# Patient Record
Sex: MALE
[De-identification: names, ages, dates, MRNs, and addresses within clinical notes are randomized per-mention and may not be internally consistent; named-entity substitution may affect disease eponyms.]

---

## 2023-12-21 ENCOUNTER — APPOINTMENT (OUTPATIENT)
Dept: PLASTIC SURGERY | Facility: CLINIC | Age: 18
End: 2023-12-21
Payer: COMMERCIAL

## 2023-12-21 VITALS — WEIGHT: 140 LBS | BODY MASS INDEX: 20.73 KG/M2 | HEIGHT: 69 IN

## 2023-12-21 DIAGNOSIS — Z78.9 OTHER SPECIFIED HEALTH STATUS: ICD-10-CM

## 2023-12-21 DIAGNOSIS — Z72.3 LACK OF PHYSICAL EXERCISE: ICD-10-CM

## 2023-12-21 PROCEDURE — 99203 OFFICE O/P NEW LOW 30 MIN: CPT

## 2023-12-21 NOTE — ASSESSMENT
[FreeTextEntry1] : office procedure  Regarding the procedure, we discussed scarring, poor wound healing, bleeding, infection, need for additional surgery, and dissatisfaction with the outcome.  Also discussed possibility of keloid and/or hypertrophic scar formation as well as recurrence.  All questions were answered and risks understood.

## 2024-03-01 ENCOUNTER — APPOINTMENT (OUTPATIENT)
Dept: PLASTIC SURGERY | Facility: CLINIC | Age: 19
End: 2024-03-01
Payer: COMMERCIAL

## 2024-03-01 DIAGNOSIS — D48.5 NEOPLASM OF UNCERTAIN BEHAVIOR OF SKIN: ICD-10-CM

## 2024-03-01 DIAGNOSIS — L72.9 FOLLICULAR CYST OF THE SKIN AND SUBCUTANEOUS TISSUE, UNSPECIFIED: ICD-10-CM

## 2024-03-01 PROCEDURE — 13100 CMPLX RPR TRUNK 1.1-2.5 CM: CPT

## 2024-03-01 PROCEDURE — 11402 EXC TR-EXT B9+MARG 1.1-2 CM: CPT

## 2024-03-01 RX ORDER — DOXYCYCLINE HYCLATE 100 MG/1
100 TABLET ORAL
Qty: 10 | Refills: 2 | Status: ACTIVE | COMMUNITY
Start: 2024-03-01 | End: 1900-01-01

## 2024-03-01 NOTE — PROCEDURE
[FreeTextEntry6] : Patient is a 18 year old male with a left axillary cyst measuring approximately 1.5 x 2 cm.    The area was prepped and draped in the usual fashion.  Local anesthetic was administered using 1% lidocaine with epinephrine.  The cyst was sharply excised.  Area was irrigated copiously.  Complex wound closure was performed in layers.  The wound measured approximately 2.3 cm.  Sterile dressing applied.    Patient tolerated procedure well and understands post-op instructions.  Sutures Used: 3-0 monocryl

## 2024-03-08 LAB — CORE LAB BIOPSY: NORMAL

## 2024-03-15 ENCOUNTER — APPOINTMENT (OUTPATIENT)
Dept: PLASTIC SURGERY | Facility: CLINIC | Age: 19
End: 2024-03-15
Payer: COMMERCIAL

## 2024-03-15 PROCEDURE — 99213 OFFICE O/P EST LOW 20 MIN: CPT

## 2024-03-15 NOTE — ASSESSMENT
[FreeTextEntry1] :  pt is 2 weeks s/p excision of left axillary cyst. With full thickness wound dehicience   - D/c remaining monocryl sutures from wound bed - Wound irrigated with 10ccs NS - LWC reviewed: Pack with 2x2 WTD gauze BID - signs and symptoms of infection reviewed - Avoid any heavy lifting  - ok to shower - Path reviewed: No cyst, Fibroadipose tissue with focal hemorrhage, likely c/w lipoma - All post op instructions reviewed, all questions answered - f/u 1-2 weeks for wound check

## 2024-03-15 NOTE — HISTORY OF PRESENT ILLNESS
[FreeTextEntry1] : 18 yr old male nonsmoker nondiabetic  cyst left axilla no prior surgery  Interval hx (3/15/24): pt is 2 weeks s/p excision of left axillary cyst. Pt states that the incision was doing fine untl 4 days ago when he notcied it opened, denies any heavy lifitng/strenous activity or trauma. Has been putting betadine on the area daily since. Denies f/c. Some drainage. Also concerned of new cyst formation adjacent to current  incision.

## 2024-03-15 NOTE — DATA REVIEWED
[FreeTextEntry1] : Patient:     RICHIE LEMOS  Accession:                             [id number]  Collected Date/Time:                   3/1/2024 13:42 EST Received Date/Time:                    3/4/2024 09:02 EST  Surgical Pathology Report - Auth (Verified)  Specimen(s) Submitted Left axillary cyst  Final Diagnosis Left axillary cyst, excision: - Fibroadipose tissue with focal hemorrhage. - No cyst identified.  Deeper levels examined.  Note:  Above findings may represent a lipoma.  Clinical correlation  recommended. Verified by: Maricarmen Flynn M.D. (Electronic Signature) Reported on: 03/08/24 11:42 EST, [facility], [place] [address] Histology technical [place] [address] Phone: [phone]   Fax: [phone] _________________________________________________________________   Clinical Information Left axillary cyst  Perioperative Diagnosis D48.5-Neoplasm of uncertain behavior of skin  Gross Description The specimen is received in formalin labeled "left axillary cyst" and consists of a fragment of grayish-brown membranous soft tissue with fibroadipose tissue measuring 2.5 x 1 x 1 cm.  The surface is inked black.  The specimen is serially sectioned and submitted entirely.  (1 block)  03/04/2024 10:44:56 EST  lm   Disclaimer In addition to other data that may appear on the specimen containers, all labels have been inspected to confirm the presence of the patients name and date of birth.  Specimen was received and underwent gross examination at [facility], [address] [place] [address].  Ordered by: SUE ISRAEL IV       Collected/Examined: 01Mar2024 01:42PM       Verification Required       Stage: Final       Performed at: [facility] (Med Director: Shashank Chapin)       Resulted: 08Mar2024 11:42AM       Last Updated: 08Mar2024 11:42AM       Accession: [id number]       Results Hx:	 There are no additional results to show Details:	 To Be Done:	01Mar2024 Status:	Resulted: Requires Verification For:	Neoplasm of uncertain behavior of skin (D48.5) Overdue:	01Jun2024 01:41PM To Be Performed:	[facility] Communicated By:	Requested transmission to performing location Priority:	Routine Ordered By:	SUE ISRAEL IV Supervised By:	SUE ISRAEL IV Managed By:	SUE ISRAEL IV Authorization:	Not Required Performing Instructions:	 Patient Instructions:	 Order Instructions:	 Questions:	 Date/Time Collected A: 01Mar2024 Number of Sites (Enter each site description below.) A: 1 Site of Specimen A: Left axillary cyst Add'l Details:	 Financial Auth:	Not Needed Authorization #:	 Appt. Status:	Appointment Not Needed Effective:	01Mar2024 12:00AM Expires:	01Mar2025 12:00AM Done:	01Mar2024 01:42PM Order #:	[id number] Requisition #:	[id number] Label Type:	 Collection:	Collection Specimen Identifier:	 ID:	 CPT:	 LOINC:	 SNOMED:	 Type:	 Charges:	None Will Be Collected in Office?	No View link item history	 Goals:	None Charging:	          (none) Encounters:	 Creation:	01Mar2024 Appointment SUE ISRAEL IV (Plastic Surgery)  Collection:	None Specified Be Done By:	None Specified Scheduled:	None Specified Performed:	None Specified Charge :	None Specified Annotations:	          (none) Electronically Signed By: SUE ISRAEL IV, MD 01-Mar-2024 1:42 PM

## 2024-03-15 NOTE — PHYSICAL EXAM
[NI] : Normal [de-identified] : Well developed, well nourished in NAD  [de-identified] : Atraumatic, normocephalic  [de-identified] : Normal ears, normal nose and normal lips  [de-identified] : VERONIKAR [de-identified] : Left axilla with 1.6cm x 2cm open wound with dark wound bed 2/2 betadine use. No surrounding cellulitis, erythema. No active drainage or malodor. New 1.4cm soft subcutaneous mass adjacent to opening

## 2024-03-26 ENCOUNTER — APPOINTMENT (OUTPATIENT)
Dept: PLASTIC SURGERY | Facility: CLINIC | Age: 19
End: 2024-03-26
Payer: COMMERCIAL

## 2024-03-26 DIAGNOSIS — D17.22 BENIGN LIPOMATOUS NEOPLASM OF SKIN AND SUBCUTANEOUS TISSUE OF LEFT ARM: ICD-10-CM

## 2024-03-26 PROCEDURE — 99213 OFFICE O/P EST LOW 20 MIN: CPT

## 2024-03-26 NOTE — DATA REVIEWED
[FreeTextEntry1] : Patient:     RICHIE LEMOS  Accession:                             [id number]  Collected Date/Time:                   3/1/2024 13:42 EST Received Date/Time:                    3/4/2024 09:02 EST  Surgical Pathology Report - Auth (Verified)  Specimen(s) Submitted Left axillary cyst  Final Diagnosis Left axillary cyst, excision: - Fibroadipose tissue with focal hemorrhage. - No cyst identified.  Deeper levels examined.  Note:  Above findings may represent a lipoma.  Clinical correlation  recommended. Verified by: Maricarmen Flynn M.D. (Electronic Signature) Reported on: 03/08/24 11:42 EST, [facility], [place] [address] Histology technical [place] [address] Phone: [phone]   Fax: [phone] _________________________________________________________________   Clinical Information Left axillary cyst  Perioperative Diagnosis D48.5-Neoplasm of uncertain behavior of skin  Gross Description The specimen is received in formalin labeled "left axillary cyst" and consists of a fragment of grayish-brown membranous soft tissue with fibroadipose tissue measuring 2.5 x 1 x 1 cm.  The surface is inked black.  The specimen is serially sectioned and submitted entirely.  (1 block)  03/04/2024 10:44:56 EST  lm   Disclaimer In addition to other data that may appear on the specimen containers, all labels have been inspected to confirm the presence of the patients name and date of birth.  Specimen was received and underwent gross examination at [facility], [address] [place] [address].  Ordered by: SUE ISRAEL IV       Collected/Examined: 01Mar2024 01:42PM       Verification Required       Stage: Final       Performed at: [facility] (Med Director: Shashank Chapin)       Resulted: 08Mar2024 11:42AM       Last Updated: 08Mar2024 11:42AM       Accession: [id number]       Results Hx:	 There are no additional results to show Details:	 To Be Done:	01Mar2024 Status:	Resulted: Requires Verification For:	Neoplasm of uncertain behavior of skin (D48.5) Overdue:	01Jun2024 01:41PM To Be Performed:	[facility] Communicated By:	Requested transmission to performing location Priority:	Routine Ordered By:	SUE ISRAEL IV Supervised By:	SUE ISRAEL IV Managed By:	SUE ISRAEL IV Authorization:	Not Required Performing Instructions:	 Patient Instructions:	 Order Instructions:	 Questions:	 Date/Time Collected A: 01Mar2024 Number of Sites (Enter each site description below.) A: 1 Site of Specimen A: Left axillary cyst Add'l Details:	 Financial Auth:	Not Needed Authorization #:	 Appt. Status:	Appointment Not Needed Effective:	01Mar2024 12:00AM Expires:	01Mar2025 12:00AM Done:	01Mar2024 01:42PM Order #:	[id number] Requisition #:	[id number] Label Type:	 Collection:	Collection Specimen Identifier:	 ID:	 CPT:	 LOINC:	 SNOMED:	 Type:	 Charges:	None Will Be Collected in Office?	No View link item history	 Goals:	None Charging:	          (none) Encounters:	 Creation:	01Mar2024 Appointment SUE ISRAEL IV (Plastic Surgery)  Collection:	None Specified Be Done By:	None Specified Scheduled:	None Specified Performed:	None Specified Charge :	None Specified Annotations:	          (none) Electronically Signed By: SUE ISRAEL IV, MD 01-Mar-2024 1:42 PM

## 2024-03-26 NOTE — HISTORY OF PRESENT ILLNESS
[FreeTextEntry1] : 18 yr old male nonsmoker nondiabetic  cyst left axilla no prior surgery  Interval hx (3/15/24): pt is 2 weeks s/p excision of left axillary cyst. Pt states that the incision was doing fine untl 4 days ago when he notcied it opened, denies any heavy lifitng/strenous activity or trauma. Has been putting betadine on the area daily since. Denies f/c. Some drainage. Also concerned of new cyst formation adjacent to current  incision.   Interval hx (3/26/24): pt is 3 weeks s/p excision of left axillary fibroadipose tissue. Has been performing WTD dressings BID, along with intermittently using betadine swabs. Denies f/c or malodor.

## 2024-03-26 NOTE — PHYSICAL EXAM
[NI] : Normal [de-identified] : Well developed, well nourished in NAD  [de-identified] : Atraumatic, normocephalic  [de-identified] : Normal ears, normal nose and normal lips  [de-identified] : VERONIKAR [de-identified] : Left axilla with 1.4cm x 2cm open wound with bright red erythematous healthy wound bed, approx 1cm tunneling superior medial. Mild surrounding erythema 2/2 adhesive bandage.  No active drainage or malodor. New 1.4cm soft subcutaneous mass adjacent to opening

## 2024-03-26 NOTE — ASSESSMENT
Recorded Pressure: PA, HR=68, Condition=Condition 1 (Pulmonary Artery) PA 74/28/47 [FreeTextEntry1] :  pt is 3 weeks s/p excision of left axillary cyst. With full thickness wound dehicience   - LWC reviewed: continue to pack with 2x2 WTD gauze BID - Ok to use MediHoney for base of wound bed once daily, along with packing. - signs and symptoms of infection reviewed - Avoid any heavy lifting  - ok to shower and get area wet, dry thoroughly after - Path reviewed: No cyst, Fibroadipose tissue with focal hemorrhage, likely c/w lipoma - All post op instructions reviewed, all questions answered - f/u 3 weeks for wound check  Seen and examined by  as well

## 2024-04-16 ENCOUNTER — APPOINTMENT (OUTPATIENT)
Dept: PLASTIC SURGERY | Facility: CLINIC | Age: 19
End: 2024-04-16
Payer: COMMERCIAL

## 2024-04-16 PROCEDURE — 99213 OFFICE O/P EST LOW 20 MIN: CPT

## 2024-04-16 NOTE — HISTORY OF PRESENT ILLNESS
[FreeTextEntry1] : 18 yr old male presents with cyst left axilla no prior surgery  nonsmoker nondiabetic  Interval hx (3/15/24): pt is 2 weeks s/p excision of left axillary cyst. Pt states that the incision was doing fine untl 4 days ago when he notcied it opened, denies any heavy lifitng/strenous activity or trauma. Has been putting betadine on the area daily since. Denies f/c. Some drainage. Also concerned of new cyst formation adjacent to current  incision.   Interval hx (3/26/24): pt is 3 weeks s/p excision of left axillary fibroadipose tissue. Has been performing WTD dressings BID, along with intermittently using betadine swabs. Denies f/c or malodor.   Interval hx (4/16/24): Pt here 6 weeks s/p excision of left axillary fibroadipose tissue with delayed healing and wound dehiscence. Performing daily wound care with packing. Denies any f/c or purulence but concerned with lack of progress in healing.

## 2024-04-16 NOTE — DATA REVIEWED
[FreeTextEntry1] : Patient:     RICHIE LEMOS   Accession:                             [id number]  Collected Date/Time:                   3/1/2024 13:42 EST Received Date/Time:                    3/4/2024 09:02 EST  Surgical Pathology Report - Auth (Verified)  Specimen(s) Submitted Left axillary cyst  Final Diagnosis Left axillary cyst, excision: - Fibroadipose tissue with focal hemorrhage. - No cyst identified.  Deeper levels examined.  Note:  Above findings may represent a lipoma.  Clinical correlation  recommended. Verified by: Maricarmen Flynn M.D. (Electronic Signature) Reported on: 03/08/24 11:42 EST, [facility], [place] [address] Histology technical [place] [address] Phone: [phone]   Fax: [phone] _______________________________________________________________

## 2024-04-16 NOTE — ASSESSMENT
[FreeTextEntry1] : 19 yo M 6 weeks s/p excision of left axillary fibroadipose tissue with wound dehiscence and delayed healing.   - Minor wound debridement performed today with wound care  - LWC reviewed: continue WTD packing BID for 3 days then daily - Wash wound with soap and water  - Path reviewed: No cyst, Fibroadipose tissue with focal hemorrhage, likely c/w lipoma - All post op instructions reviewed, all questions answered - f/u 1 week for wound check

## 2024-04-25 ENCOUNTER — APPOINTMENT (OUTPATIENT)
Dept: PLASTIC SURGERY | Facility: CLINIC | Age: 19
End: 2024-04-25
Payer: COMMERCIAL

## 2024-04-25 PROCEDURE — 99213 OFFICE O/P EST LOW 20 MIN: CPT

## 2024-04-25 NOTE — DATA REVIEWED
[FreeTextEntry1] : Patient:     RICHIE LEMOS   Accession:                             [id number]  Collected Date/Time:                   3/1/2024 13:42 EST Received Date/Time:                    3/4/2024 09:02 EST  Surgical Pathology Report - Auth (Verified)  Specimen(s) Submitted Left axillary cyst  Final Diagnosis Left axillary cyst, excision: - Fibroadipose tissue with focal hemorrhage. - No cyst identified.  Deeper levels examined.  Note:  Above findings may represent a lipoma.  Clinical correlation  recommended. Verified by: Maricarmen Flynn M.D. (Electronic Signature) Reported on: 03/08/24 11:42 EST, [facility], [place] [address] Histology technical [place] [address] Phone: [phone]   Fax: [phone] _______________________________________________________________

## 2024-04-25 NOTE — PHYSICAL EXAM
[NI] : Normal [de-identified] : Well developed, well nourished in NAD  [de-identified] : Left axilla with 2cm open wound with granulating base, inferior wound epithelializing and filling in, superior aspect with 0.5 cm depth, clean, no periwound erythema, no purulence

## 2024-04-25 NOTE — HISTORY OF PRESENT ILLNESS
[FreeTextEntry1] : 19 yr old male presents with cyst left axilla no prior surgery  nonsmoker nondiabetic  Interval hx (3/15/24): pt is 2 weeks s/p excision of left axillary cyst. Pt states that the incision was doing fine until 4 days ago when he noticed it opened, denies any heavy lifting/strenuous activity or trauma. Has been putting betadine on the area daily since. Denies f/c. Some drainage. Also concerned of new cyst formation adjacent to current  incision.   Interval hx (3/26/24): pt is 3 weeks s/p excision of left axillary fibroadipose tissue. Has been performing WTD dressings BID, along with intermittently using betadine swabs. Denies f/c or malodor.   Interval hx (4/16/24): Pt here 6 weeks s/p excision of left axillary fibroadipose tissue with delayed healing and wound dehiscence. Performing daily wound care with packing. Denies any f/c or purulence but concerned with lack of progress in healing.   Interval hx (4/25/24): Pt here 7 weeks s/p excision of left axillary fibroadipose tissue with delayed healing and wound dehiscence concerned with scarring. Noticed improvement in healing after debridement last week. Denies any f/c or purulent drainage.

## 2024-04-25 NOTE — ASSESSMENT
[FreeTextEntry1] : 19 yo M 7 weeks s/p excision of left axillary fibroadipose tissue with wound dehiscence and delayed healing.   - Wound care performed today  - LWC reviewed: daily Bacitracin for 3 days then daily Medi Honey or Aquaphor=r - Wash wound with soap and water  - Path reviewed: No cyst, Fibroadipose tissue with focal hemorrhage, likely c/w lipoma - All post op instructions reviewed, all questions answered - f/u 2 weeks for wound check

## 2024-05-09 ENCOUNTER — APPOINTMENT (OUTPATIENT)
Dept: PLASTIC SURGERY | Facility: CLINIC | Age: 19
End: 2024-05-09
Payer: COMMERCIAL

## 2024-05-09 PROCEDURE — 99212 OFFICE O/P EST SF 10 MIN: CPT

## 2024-05-17 NOTE — PHYSICAL EXAM
[NI] : Normal [de-identified] : Well developed, well nourished in NAD  [de-identified] : Left axilla with an open wound with granulating base, inferior wound epithelializing and filling in, superior aspect with 0.5 cm depth, clean, no periwound erythema, no purulence

## 2024-05-17 NOTE — HISTORY OF PRESENT ILLNESS
[FreeTextEntry1] : 19 yr old male presents with cyst left axilla no prior surgery  nonsmoker nondiabetic  Interval hx (3/15/24): pt is 2 weeks s/p excision of left axillary cyst. Pt states that the incision was doing fine until 4 days ago when he noticed it opened, denies any heavy lifting/strenuous activity or trauma. Has been putting betadine on the area daily since. Denies f/c. Some drainage. Also concerned of new cyst formation adjacent to current  incision.   Interval hx (3/26/24): pt is 3 weeks s/p excision of left axillary fibroadipose tissue. Has been performing WTD dressings BID, along with intermittently using betadine swabs. Denies f/c or malodor.   Interval hx (4/16/24): Pt here 6 weeks s/p excision of left axillary fibroadipose tissue with delayed healing and wound dehiscence. Performing daily wound care with packing. Denies any f/c or purulence but concerned with lack of progress in healing.   Interval hx (4/25/24): Pt here 7 weeks s/p excision of left axillary fibroadipose tissue with delayed healing and wound dehiscence concerned with scarring. Noticed improvement in healing after debridement last week. Denies any f/c or purulent drainage.   Interval hx (5/9/24): Pt here 9 weeks s/p excision of left axillary fibroadipose tissue with delayed healing and wound dehiscence concerned with scarring. Denies any new concerns. Wound starting to close finally. Denies any f/c or purulent drainage.

## 2024-05-17 NOTE — DATA REVIEWED
[FreeTextEntry1] : Patient:     RICHIE LEMOS   Accession:                             [id number]  Collected Date/Time:                   3/1/2024 13:42 EST Received Date/Time:                    3/4/2024 09:02 EST  Surgical Pathology Report - Auth (Verified)  Specimen(s) Submitted Left axillary cyst  Final Diagnosis Left axillary cyst, excision: - Fibroadipose tissue with focal hemorrhage. - No cyst identified.  Deeper levels examined.  Note:  Above findings may represent a lipoma.  Clinical correlation  recommended. Verified by: Maricarmen Flynn M.D. (Electronic Signature) Reported on: 03/08/24 11:42 EST, [facility], [place] [address] Histology technical [place] [address] Phone: [phone]   Fax: [phone] _______________________________________________________________

## 2024-05-17 NOTE — ASSESSMENT
[FreeTextEntry1] : 17 yo M 9 weeks s/p excision of left axillary fibroadipose tissue with wound dehiscence and delayed healing. Responding to local wound care.   - Wound care performed today,  - Patient and father were reassured  - LWC reviewed: daily Medi Honey or Aquaphor - Wash wound with soap and water  - Path reviewed: No cyst, Fibroadipose tissue with focal hemorrhage, likely c/w lipoma - All post op instructions reviewed, all questions answered - f/u 1 month with Dr. Frausto.

## 2024-05-31 ENCOUNTER — APPOINTMENT (OUTPATIENT)
Dept: PLASTIC SURGERY | Facility: CLINIC | Age: 19
End: 2024-05-31
Payer: COMMERCIAL

## 2024-05-31 DIAGNOSIS — S41.102S UNSPECIFIED OPEN WOUND OF LEFT UPPER ARM, SEQUELA: ICD-10-CM

## 2024-05-31 PROCEDURE — 99213 OFFICE O/P EST LOW 20 MIN: CPT

## 2024-05-31 NOTE — PHYSICAL EXAM
[NI] : Normal [de-identified] : Well developed, well nourished in NAD  [de-identified] : Left axilla with an open wound with protruding hyper granualtion tissue  clean, no periwound erythema, no purulence

## 2024-05-31 NOTE — ASSESSMENT
[FreeTextEntry1] : 18 yo M 3 months s/p excision of left axillary fibroadipose tissue with wound dehiscence and delayed healing.   - Nitro stick applied today with good tissue response - Continue LWC with baci BID - Wash wound with soap and water  - s/s infection reviewed - Path reviewed: No cyst, Fibroadipose tissue with focal hemorrhage, likely c/w lipoma - All post op instructions reviewed, all questions answered - Keep f/u June 20th as scheduled

## 2024-05-31 NOTE — HISTORY OF PRESENT ILLNESS
[FreeTextEntry1] : 19 yr old male presents with cyst left axilla no prior surgery  nonsmoker nondiabetic  Interval hx (3/15/24): pt is 2 weeks s/p excision of left axillary cyst. Pt states that the incision was doing fine until 4 days ago when he noticed it opened, denies any heavy lifting/strenuous activity or trauma. Has been putting betadine on the area daily since. Denies f/c. Some drainage. Also concerned of new cyst formation adjacent to current  incision.   Interval hx (3/26/24): pt is 3 weeks s/p excision of left axillary fibroadipose tissue. Has been performing WTD dressings BID, along with intermittently using betadine swabs. Denies f/c or malodor.   Interval hx (4/16/24): Pt here 6 weeks s/p excision of left axillary fibroadipose tissue with delayed healing and wound dehiscence. Performing daily wound care with packing. Denies any f/c or purulence but concerned with lack of progress in healing.   Interval hx (4/25/24): Pt here 7 weeks s/p excision of left axillary fibroadipose tissue with delayed healing and wound dehiscence concerned with scarring. Noticed improvement in healing after debridement last week. Denies any f/c or purulent drainage.   Interval hx (5/9/24): Pt here 9 weeks s/p excision of left axillary fibroadipose tissue with delayed healing and wound dehiscence concerned with scarring. Denies any new concerns. Wound starting to close finally. Denies any f/c or purulent drainage.   Interval hx (5/31/24): Pt is 3 months s/p excision of left axillary fibroadipose tissue with delayed healing and wound dehiscence. Reports wound has been stable since last visit, using Aquaphor daily, s/p Nitro stick at last visit. Denies any f/c or purulent drainage.

## 2024-05-31 NOTE — DATA REVIEWED
[FreeTextEntry1] : Patient:     RICHIE LEMOS   Accession:                             [id number]  Collected Date/Time:                   3/1/2024 13:42 EST Received Date/Time:                    3/4/2024 09:02 EST  Surgical Pathology Report - Auth (Verified)  Specimen(s) Submitted Left axillary cyst  Final Diagnosis Left axillary cyst, excision: - Fibroadipose tissue with focal hemorrhage. - No cyst identified.  Deeper levels examined.  Note:  Above findings may represent a lipoma.  Clinical correlation  recommended. Verified by: Maricarmen Flynn M.D. (Electronic Signature) Reported on: 03/08/24 11:42 EST, [facility], [place] [address] Histology technical [place] [address] Phone: [phone]   Fax: [phone] _______________________________________________________________

## 2024-06-20 ENCOUNTER — APPOINTMENT (OUTPATIENT)
Dept: PLASTIC SURGERY | Facility: CLINIC | Age: 19
End: 2024-06-20
Payer: COMMERCIAL

## 2024-06-20 PROCEDURE — 99024 POSTOP FOLLOW-UP VISIT: CPT

## 2024-06-20 NOTE — PHYSICAL EXAM
[NI] : Normal [de-identified] : Well developed, well nourished in NAD  [de-identified] : Left axilla with an open wound with protruding hyper granualtion tissue  clean, no periwound erythema, no purulence

## 2024-06-20 NOTE — ASSESSMENT
[FreeTextEntry1] : 20 yo M 3 months s/p excision of left axillary fibroadipose tissue with wound dehiscence and delayed healing.   - Nitro stick applied today with good tissue response - Continue LWC with baci BID - Wash wound with soap and water  - s/s infection reviewed - Path reviewed: No cyst, Fibroadipose tissue with focal hemorrhage, likely c/w lipoma - All post op instructions reviewed, all questions answered - Keep f/u June 20th as scheduled   6/20/2024 as above \no issues left axillary scar fine no issues  pt and father happy  Wound care instructions given.

## 2025-02-05 NOTE — PHYSICAL EXAM
[NI] : Normal [de-identified] : Well developed, well nourished in NAD  [de-identified] : Left axilla with 2cm open wound with granulating base and scattered fibrinous tissue now debrided, no periwound erythema, no purulence  hematuria